# Patient Record
Sex: MALE | Race: WHITE | NOT HISPANIC OR LATINO
[De-identification: names, ages, dates, MRNs, and addresses within clinical notes are randomized per-mention and may not be internally consistent; named-entity substitution may affect disease eponyms.]

---

## 2022-06-28 ENCOUNTER — APPOINTMENT (OUTPATIENT)
Dept: ORTHOPEDIC SURGERY | Facility: CLINIC | Age: 62
End: 2022-06-28
Payer: COMMERCIAL

## 2022-06-28 ENCOUNTER — TRANSCRIPTION ENCOUNTER (OUTPATIENT)
Age: 62
End: 2022-06-28

## 2022-06-28 VITALS — HEIGHT: 74 IN | WEIGHT: 230 LBS | BODY MASS INDEX: 29.52 KG/M2

## 2022-06-28 PROBLEM — Z00.00 ENCOUNTER FOR PREVENTIVE HEALTH EXAMINATION: Status: ACTIVE | Noted: 2022-06-28

## 2022-06-28 PROCEDURE — J3490M: CUSTOM

## 2022-06-28 PROCEDURE — 20611 DRAIN/INJ JOINT/BURSA W/US: CPT

## 2022-06-28 PROCEDURE — 73562 X-RAY EXAM OF KNEE 3: CPT | Mod: RT

## 2022-06-28 NOTE — PROCEDURE
[Large Joint Injection] : Large joint injection [Right] : of the right [Knee] : knee [Pain] : pain [Alcohol] : alcohol [Betadine] : betadine [___ cc    3mg] :  Betamethasone (Celestone) ~Vcc of 3mg [___ cc    1%] : Lidocaine ~Vcc of 1%  [___ cc    0.25%] : Bupivacaine (Marcaine) ~Vcc of 0.25%  [] : Patient tolerated procedure well [Call if redness, pain or fever occur] : call if redness, pain or fever occur [Apply ice for 15min out of every hour for the next 12-24 hours as tolerated] : apply ice for 15 minutes out of every hour for the next 12-24 hours as tolerated [Patient was advised to rest the joint(s) for ____ days] : patient was advised to rest the joint(s) for [unfilled] days [Previous OTC use and PT nontherapeutic] : patient has tried OTC's including aspirin, Ibuprofen, Aleve, etc or prescription NSAIDS, and/or exercises at home and/or physical therapy without satisfactory response [Patient had decreased mobility in the joint] : patient had decreased mobility in the joint [Risks, benefits, alternatives discussed / Verbal consent obtained] : the risks benefits, and alternatives have been discussed, and verbal consent was obtained [All ultrasound images have been permanently captured and stored accordingly in our picture archiving and communication system] : All ultrasound images have been permanently captured and stored accordingly in our picture archiving and communication system [Visualization of the needle and placement of injection was performed without complication] : visualization of the needle and placement of injection was performed without complication [Inflammation] : inflammation [Effusion] : effusion [de-identified] : 15cc [de-identified] : clear [de-identified] : for accurate placement of needle into knee joint

## 2022-06-28 NOTE — PHYSICAL EXAM
[NL (0)] : extension 0 degrees [5___] : hamstring 5[unfilled]/5 [Equivocal] : equivocal Doc [] : patient ambulates without assistive device [Right] : right knee [Degenerative change] : Degenerative change [TWNoteComboBox7] : flexion 120 degrees

## 2022-06-28 NOTE — ASSESSMENT
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  no prior right knee issues.  medial knee pain. mild oa present.  possible pf oa.\par \par history of left knee with oa, mmt and enchondroma distal femur 2015 - treated nonop with csi. \par \par [employer].

## 2022-06-28 NOTE — DISCUSSION/SUMMARY
[de-identified] : Instructions:  Progress Note completed by Amanda Villalba PA-C\par * Dr. Mike -- The documentation recorded accurately reflects the decisions made by me during this visit.

## 2022-06-28 NOTE — HISTORY OF PRESENT ILLNESS
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  no prior right knee issues.  pain worse with prolonged activities.  reports to tightness and stiffness.  occ clicking.  no buckling.  rest, ice, otc meds prn, hep, activity modification with little relief.  [FreeTextEntry5] : pt has been having feeling pain in the left knee for over  month . pt feels discomfort in the right knee. pt  was traveling when he stood up from his plane seat and felt tightness in the right knee  [de-identified] : motion

## 2022-08-09 ENCOUNTER — APPOINTMENT (OUTPATIENT)
Dept: ORTHOPEDIC SURGERY | Facility: CLINIC | Age: 62
End: 2022-08-09

## 2022-08-09 VITALS — BODY MASS INDEX: 29.52 KG/M2 | HEIGHT: 74 IN | WEIGHT: 230 LBS

## 2022-08-09 DIAGNOSIS — M17.11 UNILATERAL PRIMARY OSTEOARTHRITIS, RIGHT KNEE: ICD-10-CM

## 2022-08-09 DIAGNOSIS — M23.91 UNSPECIFIED INTERNAL DERANGEMENT OF RIGHT KNEE: ICD-10-CM

## 2022-08-09 DIAGNOSIS — M25.461 EFFUSION, RIGHT KNEE: ICD-10-CM

## 2022-08-09 PROCEDURE — 99212 OFFICE O/P EST SF 10 MIN: CPT

## 2022-08-09 NOTE — PHYSICAL EXAM
[NL (0)] : extension 0 degrees [5___] : hamstring 5[unfilled]/5 [Equivocal] : equivocal Doc [Right] : right knee [Degenerative change] : Degenerative change [] : non-antalgic [TWNoteComboBox7] : flexion 130 degrees

## 2022-08-09 NOTE — DISCUSSION/SUMMARY
[de-identified] : Instructions:  Progress Note completed by Amanda Villalba PA-C\par * Dr. Mike -- The documentation recorded accurately reflects the decisions made by me during this visit.

## 2022-08-09 NOTE — ASSESSMENT
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  no prior right knee issues.  medial knee pain. mild oa present.  possible pf oa. improving.  \par \par history of left knee with oa, mmt and enchondroma distal femur 2015 - treated nonop with csi. \par \par [employer].

## 2022-08-09 NOTE — HISTORY OF PRESENT ILLNESS
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  no prior right knee issues.  pain worse with prolonged activities.  reports to tightness and stiffness.  occ clicking.  no buckling.  rest, ice, otc meds prn, hep, activity modification with little relief. \par 8/9/22:   follow up right knee.  improved with time and csi last visit.  reports to no pain today.  [FreeTextEntry1] : right knee [de-identified] : cortisone